# Patient Record
Sex: MALE
[De-identification: names, ages, dates, MRNs, and addresses within clinical notes are randomized per-mention and may not be internally consistent; named-entity substitution may affect disease eponyms.]

---

## 2019-03-01 ENCOUNTER — HOSPITAL ENCOUNTER (EMERGENCY)
Dept: HOSPITAL 42 - ED | Age: 54
Discharge: HOME | End: 2019-03-01
Payer: MEDICAID

## 2019-03-01 VITALS
RESPIRATION RATE: 18 BRPM | DIASTOLIC BLOOD PRESSURE: 91 MMHG | SYSTOLIC BLOOD PRESSURE: 139 MMHG | OXYGEN SATURATION: 100 % | HEART RATE: 73 BPM

## 2019-03-01 VITALS — TEMPERATURE: 98.7 F

## 2019-03-01 VITALS — BODY MASS INDEX: 36.9 KG/M2

## 2019-03-01 DIAGNOSIS — I10: Primary | ICD-10-CM

## 2019-03-01 DIAGNOSIS — G89.29: ICD-10-CM
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
## 2019-03-01 NOTE — RAD
PROCEDURE:  Left Knee Radiographs.



HISTORY:

lt. knee pain x 3 years



COMPARISON:

Left knee radiographs performed 12/21/14



FINDINGS:



BONES:

Osseous demineralization.  Degenerative changes including tenting of 

the intercondylar notch.  No acute displaced fracture. 



JOINTS:

No dislocation.  Patellofemoral joint space narrowing. 



JOINT EFFUSION:

Probable small suprapatellar joint effusion. 



OTHER FINDINGS:

None.



IMPRESSION:

Degenerative changes.  Patellofemoral joint space narrowing.  

Probable small suprapatellar joint effusion. 



No acute displaced fracture or dislocation.